# Patient Record
Sex: FEMALE | Race: WHITE
[De-identification: names, ages, dates, MRNs, and addresses within clinical notes are randomized per-mention and may not be internally consistent; named-entity substitution may affect disease eponyms.]

---

## 2018-05-15 ENCOUNTER — HOSPITAL ENCOUNTER (EMERGENCY)
Dept: HOSPITAL 45 - C.EDB | Age: 63
Discharge: HOME | End: 2018-05-15
Payer: COMMERCIAL

## 2018-05-15 VITALS — SYSTOLIC BLOOD PRESSURE: 108 MMHG | DIASTOLIC BLOOD PRESSURE: 66 MMHG | OXYGEN SATURATION: 96 % | HEART RATE: 71 BPM

## 2018-05-15 VITALS — OXYGEN SATURATION: 94 %

## 2018-05-15 VITALS — TEMPERATURE: 98.06 F

## 2018-05-15 VITALS — HEIGHT: 65.98 IN

## 2018-05-15 DIAGNOSIS — J44.9: ICD-10-CM

## 2018-05-15 DIAGNOSIS — Z98.51: ICD-10-CM

## 2018-05-15 DIAGNOSIS — Z82.3: ICD-10-CM

## 2018-05-15 DIAGNOSIS — M79.605: Primary | ICD-10-CM

## 2018-05-15 DIAGNOSIS — Z79.82: ICD-10-CM

## 2018-05-15 DIAGNOSIS — F17.210: ICD-10-CM

## 2018-05-15 DIAGNOSIS — Z79.899: ICD-10-CM

## 2018-05-15 NOTE — DIAGNOSTIC IMAGING REPORT
LUMBAR SPINE 5 VIEWS



HISTORY:      LLE pain - possible lumbar radiculitis



COMPARISON: None.



FINDINGS: There is no fracture.  No subluxation. Mild disc space are at L5-S1.

S1 appears to be a transitional vertebra. Remaining disc spaces are preserved.

Mild facet osteoarthritis within the L5-S1. Calcification within the right upper

quadrant is likely due to the overlying ribs but could represent a 5 mm renal

stone.



IMPRESSION:  

Mild degenerative changes at L5-S1. No fracture or subluxation within the lumbar

spine.







Electronically signed by:  Jose Robreto Raya M.D.

5/15/2018 10:25 AM



Dictated Date/Time:  5/15/2018 10:23 AM

## 2018-05-15 NOTE — EMERGENCY ROOM VISIT NOTE
History


First contact with patient:  08:49


Chief Complaint:  LEG PAIN,LEG INJURY


Stated Complaint:  SEVERE LEFT LEG PAIN





History of Present Illness


The patient is a 62 year old female who presents to the Emergency Room with 

complaints of severe left thigh and knee pain.  The patient reports that her 

symptoms started on Sunday and have progressively worsened.  Her pain is 

worsened with any movement and ambulation at this point.  She has noticed some 

swelling of the leg.  She currently denies any pain extending into the left hip

, buttock or back.  She denies any paresthesias or numbness of the left lower 

extremity.  The patient denies any history of left hip or knee arthritis.  She 

denies history of chronic back pain or sciatica.  The patient reports that she 

was at the [facility] emergency department yesterday and had x-rays 

and ultrasounds done.  She was provided a prescription for Flexeril, but 

reports that her pain is not currently being controlled.  The patient denies 

any recent trauma to the leg.  She denies any prior history of blood clots.  

She rates her discomfort a 10 out of 10.





Review of Systems


10 system review was performed and was negative except for pertinent positives 

and negatives as indicated in history of present illness





Past Medical/Surgical History


Medical Problems:


(1) Asthma


(2) COPD (chronic obstructive pulmonary disease)


Surgical Problems:


(1) History of hernia repair


(2) History of tubal ligation








Family History





CVA





Social History


Smoking Status:  Current Every Day Smoker


Alcohol Use:  none


Housing Status:  lives with family


Occupation Status:  unemployed





Current/Historical Medications


Scheduled


Alendronate Sodium (Alendronate Sodium), 70 MG PO WK


Aspirin (Aspirin Ec), 81 MG PO DAILY


Cyclobenzaprine Hcl (Flexeril), 5 MG PO TID


Fluticasone Prop/Salmeterol (Advair Diskus 250/50 60 Dose), 1 PUFF INH BID


Potassium Chloride (Potassium Chloride), 7.5 ML PO DAILY


Simvastatin (Zocor), 10 MG PO QPM





Scheduled PRN


Albuterol Hfa (Ventolin Hfa), 2-4 PUFFS INH Q6H PRN for SOB/Wheezing


Oxycodone Ir (Roxicodone Ir), 1 TAB PO Q4H PRN for Pain





Physical Exam


Vital Signs











  Date Time  Temp Pulse Resp B/P (MAP) Pulse Ox O2 Delivery O2 Flow Rate FiO2


 


5/15/18 11:30  71 18 108/66 96   


 


5/15/18 10:45  78 18 104/63 95 Nasal Cannula 3.0 


 


5/15/18 08:48     94 Nasal Cannula 3.0 


 


5/15/18 08:39 36.7 114 20 117/65 95 Room Air  











Physical Exam


CONSTITUTIONAL:  Healthy and well nourished.  Alert and oriented X 3 with 

positive affect.  Patient appears in moderate discomfort from pain.


HEENT:  Normocephalic, atraumatic.  Pupils equal, round and reactive.  No 

scleral icterus or conjunctival injection.


NECK:  Full active range of motion without discomfort.


RESPIRATORY:  Clear to auscultation bilaterally with no wheezing, crackles, 

rhonchi or stridor.


CARDIOVASCULAR:  Regular rate and rhythm with no murmurs, rubs or gallops.


GASTROINTESTINAL:  Bowel sounds present in all quadrants.  Soft and nontender 

to palpation.


MUSCULOSKELETAL: Examination shows no tenderness to palpation through the lower 

lumbar spine, left SI joint or sciatic notch.  Negative logroll.  Examination 

shows notable tenderness to palpation over the left lateral thigh, posterior 

thigh and general knee region.  No soft tissue edema, ecchymosis or increased 

warmth to palpation.  Pedal pulses are strong and bounding.


INTEGUMENTARY:  No rash or other significant dermatologic conditions noted.


NEUROLOGIC:  No focal neurologic deficits noted.  Left foot and toes are 

sensory intact.





Medical Decision & Procedures


ER Provider


Diagnostic Interpretation:


My interpretation of lumbar spine and left femur x-rays does not show any 

obvious fractures, subluxations or dislocations.  Radiologist reports are as 

follows:





LUMBAR SPINE 5 VIEWS





HISTORY:      LLE pain - possible lumbar radiculitis





COMPARISON: None.





FINDINGS: There is no fracture.  No subluxation. Mild disc space are at L5-S1.


S1 appears to be a transitional vertebra. Remaining disc spaces are preserved.


Mild facet osteoarthritis within the L5-S1. Calcification within the right upper


quadrant is likely due to the overlying ribs but could represent a 5 mm renal


stone.





IMPRESSION:  


Mild degenerative changes at L5-S1. No fracture or subluxation within the lumbar


spine.





==============================================





LEFT FEMUR 4 VIEWS





HISTORY:      Left femur and knee pain.





COMPARISON: None.





FINDINGS: There is no fracture or dislocation. Soft tissues are unremarkable.


The visualized pelvic bones are intact. Cartilage spaces within the left hip are


within normal limits. Diffuse muscle atrophy. No significant knee effusion.





IMPRESSION:  


No fracture or dislocation within the left femur.





Medications Administered











 Medications


  (Trade)  Dose


 Ordered  Sig/Enrrique


 Route  Start Time


 Stop Time Status Last Admin


Dose Admin


 


 Ondansetron HCl


  (Zofran Odt)  4 mg  ONE  ONCE


 PO  5/15/18 09:15


 5/15/18 09:16 DC 5/15/18 09:08


4 MG


 


 Hydromorphone HCl


  (Dilaudid Inj)  0.5 mg  ONE  STAT


 IM  5/15/18 09:01


 5/15/18 09:04 DC 5/15/18 09:08


0.5 MG











ED Course


Patient history and physical exam were performed.  Nurse's notes were reviewed.

  Vital signs were reviewed and were grossly normal.  The patient was 

administered IM Dilaudid and Zofran ODT for pain.  Records were reviewed from 

the [facility].  The patient had a venous ultrasound of the left lower 

extremity that was normal.  She also had an x-ray of the left knee that was 

normal.  She had lab work performed that was normal, along with an ECG and 

chest x-ray that were unremarkable.  The patient was provided a prescription 

for Flexeril, and instructed to follow-up with her PCP for further management.





Based on physical exam findings, I suggested that we add an x-ray of the lumbar 

spine and femur.  The patient was administered OxyIR 5 mg for pain.  X-rays of 

the lower back and left femur were normal.  Upon return to examine the patient, 

she was resting comfortably.  The patient still had tenderness to palpation of 

the right proximal femur, again without any skin changes or other concerning 

physical exam findings.  The case was also discussed with Dr. Massey, ED 

attending physician, who also evaluated the patient and agrees with discharge 

planning and PCP follow-up.  The daughter who is with her reports that she has 

both a walker and access to a wheelchair at home.  I did encourage limited 

weightbearing over the next several days.  She was encouraged to take ibuprofen 

and Tylenol for baseline pain relief.  She was provided a prescription for 

OxyIR 5 mg.  She was warned about sedation and constipation while taking these 

medications.  The patient was happy with plan of care, voiced understanding of 

all discharge instructions, and rated her pain a 3 out of 10 at the time of 

discharge.  The patient was trial ambulated with a walker with success.





Medical Decision








Medication Reconcilliation


Current Medication List:  was personally reviewed by me





Blood Pressure Screening


Patient's blood pressure:  Normal blood pressure





Impression





 Primary Impression:  


 Pain of left lower extremity





Departure Information


Prescriptions





Oxycodone Ir (Roxicodone Ir) 5 Mg Tab


1 TAB PO Q4H Y for Pain, #10 TAB


   For Initial Treatment


   Prov: Rodger Kim PA         5/15/18





Referrals


No Doctor, Assigned (PCP)





Patient Instructions


My [facility]

## 2018-05-15 NOTE — EMERGENCY ROOM VISIT NOTE
ED Visit Note


First contact with patient:  10:54


I have personally seen and evaluated the patient with the PA.  I agree with the 

diagnosis and management decisions and have been personally involved in the 

case. Please see Gilles Kim PA-C's notes for further details of the history, 

physical and visit.

## 2018-05-15 NOTE — DIAGNOSTIC IMAGING REPORT
LEFT FEMUR 4 VIEWS



HISTORY:      Left femur and knee pain.



COMPARISON: None.



FINDINGS: There is no fracture or dislocation. Soft tissues are unremarkable.

The visualized pelvic bones are intact. Cartilage spaces within the left hip are

within normal limits. Diffuse muscle atrophy. No significant knee effusion.



IMPRESSION:  

No fracture or dislocation within the left femur.







Electronically signed by:  Jose Roberto Raya M.D.

5/15/2018 10:23 AM



Dictated Date/Time:  5/15/2018 10:21 AM